# Patient Record
Sex: FEMALE | Race: BLACK OR AFRICAN AMERICAN | NOT HISPANIC OR LATINO | Employment: FULL TIME | URBAN - METROPOLITAN AREA
[De-identification: names, ages, dates, MRNs, and addresses within clinical notes are randomized per-mention and may not be internally consistent; named-entity substitution may affect disease eponyms.]

---

## 2024-07-23 PROBLEM — I10 HYPERTENSION: Chronic | Status: ACTIVE | Noted: 2024-07-23

## 2024-07-23 PROBLEM — I10 ESSENTIAL (PRIMARY) HYPERTENSION: Status: ACTIVE | Noted: 2024-07-23

## 2024-08-21 ENCOUNTER — PATIENT MESSAGE (OUTPATIENT)
Dept: ADMINISTRATIVE | Facility: HOSPITAL | Age: 34
End: 2024-08-21

## 2024-08-28 PROBLEM — D50.0 IRON DEFICIENCY ANEMIA DUE TO CHRONIC BLOOD LOSS: Status: ACTIVE | Noted: 2024-08-28

## 2024-11-05 PROBLEM — R00.0 TACHYCARDIA: Status: ACTIVE | Noted: 2024-11-05

## 2024-11-05 PROBLEM — D50.9 HYPOCHROMIC MICROCYTIC ANEMIA: Status: ACTIVE | Noted: 2024-11-05

## 2024-11-15 ENCOUNTER — PATIENT OUTREACH (OUTPATIENT)
Dept: ADMINISTRATIVE | Facility: HOSPITAL | Age: 34
End: 2024-11-15

## 2024-11-15 PROBLEM — L30.4 INTERTRIGO: Status: ACTIVE | Noted: 2024-11-15

## 2024-11-21 ENCOUNTER — OUTPATIENT CASE MANAGEMENT (OUTPATIENT)
Dept: ADMINISTRATIVE | Facility: OTHER | Age: 34
End: 2024-11-21

## 2024-11-21 NOTE — PROGRESS NOTES
Sw received a referral to assist with therapy resources. [facility] had received a consult to see Patient. However, the Clinic does not have a therapist available to see her at this time. Sw mailed Patient a letter explaining this. She was provided the contact information for some in-network providers. Sw encouraged Patient to contact one to schedule an assessment if she was still in need of services.    Alena Hurtado LCSW    [phone]